# Patient Record
Sex: MALE | Race: WHITE | NOT HISPANIC OR LATINO | Employment: FULL TIME | URBAN - METROPOLITAN AREA
[De-identification: names, ages, dates, MRNs, and addresses within clinical notes are randomized per-mention and may not be internally consistent; named-entity substitution may affect disease eponyms.]

---

## 2020-10-08 ENCOUNTER — OFFICE VISIT (OUTPATIENT)
Dept: URGENT CARE | Facility: CLINIC | Age: 35
End: 2020-10-08
Payer: COMMERCIAL

## 2020-10-08 VITALS
HEART RATE: 103 BPM | BODY MASS INDEX: 32.93 KG/M2 | TEMPERATURE: 98 F | WEIGHT: 230 LBS | HEIGHT: 70 IN | SYSTOLIC BLOOD PRESSURE: 129 MMHG | OXYGEN SATURATION: 98 % | DIASTOLIC BLOOD PRESSURE: 69 MMHG

## 2020-10-08 DIAGNOSIS — T78.40XA ALLERGIC REACTION, INITIAL ENCOUNTER: Primary | ICD-10-CM

## 2020-10-08 PROCEDURE — 96372 PR INJECTION,THERAP/PROPH/DIAG2ST, IM OR SUBCUT: ICD-10-PCS | Mod: S$GLB,,, | Performed by: INTERNAL MEDICINE

## 2020-10-08 PROCEDURE — 99214 PR OFFICE/OUTPT VISIT, EST, LEVL IV, 30-39 MIN: ICD-10-PCS | Mod: 25,S$GLB,, | Performed by: INTERNAL MEDICINE

## 2020-10-08 PROCEDURE — 96372 THER/PROPH/DIAG INJ SC/IM: CPT | Mod: S$GLB,,, | Performed by: INTERNAL MEDICINE

## 2020-10-08 PROCEDURE — 99214 OFFICE O/P EST MOD 30 MIN: CPT | Mod: 25,S$GLB,, | Performed by: INTERNAL MEDICINE

## 2020-10-08 RX ORDER — BETAMETHASONE SODIUM PHOSPHATE AND BETAMETHASONE ACETATE 3; 3 MG/ML; MG/ML
9 INJECTION, SUSPENSION INTRA-ARTICULAR; INTRALESIONAL; INTRAMUSCULAR; SOFT TISSUE ONCE
Status: COMPLETED | OUTPATIENT
Start: 2020-10-08 | End: 2020-10-08

## 2020-10-08 RX ADMIN — BETAMETHASONE SODIUM PHOSPHATE AND BETAMETHASONE ACETATE 9 MG: 3; 3 INJECTION, SUSPENSION INTRA-ARTICULAR; INTRALESIONAL; INTRAMUSCULAR; SOFT TISSUE at 09:10

## 2020-10-08 NOTE — PROGRESS NOTES
"Subjective:       Patient ID: Guillermo Wellington is a 35 y.o. male.    Vitals:  height is 5' 10" (1.778 m) and weight is 104.3 kg (230 lb). His temperature is 98.4 °F (36.9 °C). His blood pressure is 129/69 and his pulse is 103. His oxygen saturation is 98%.     Chief Complaint: Eye Problem and Allergic Reaction    Pt states that he had a reaction to an unknown substance yesterday at approximately 4:00pm. Pt states that he took a child's dose of benadryl with no relief. Swelling has improved since yesterday. NO CP, SOB, hives    Eye Problem   Both eyes are affected.This is a new problem. The current episode started yesterday. The problem occurs constantly. The problem has been gradually improving. There was no injury mechanism. The pain is at a severity of 0/10. The patient is experiencing no pain. There is no known exposure to pink eye. He does not wear contacts. Associated symptoms include itching. Pertinent negatives include no blurred vision, eye discharge, double vision, eye redness, fever, foreign body sensation, nausea, photophobia, recent URI or vomiting. He has tried nothing (bendryl) for the symptoms. The treatment provided no relief.   Allergic Reaction  This is a new problem. The current episode started yesterday. The problem has been gradually improving since onset. The problem is mild. It is unknown what he was exposed to. The exposure occurred at home. Associated symptoms include eye itching. Pertinent negatives include no abdominal pain, chest pain, chest pressure, coughing, diarrhea, difficulty breathing, drooling, eye redness, eye watering, globus sensation, hyperventilation, itching, rash, skin blistering, stridor, trouble swallowing, vomiting or wheezing. Past treatments include diphenhydramine. The treatment provided no relief. There is no history of asthma, atopic dermatitis, food allergies, medication allergies or seasonal allergies. Swelling is present on the eyes.       Constitution: Negative for " chills, fatigue and fever.   HENT: Negative for drooling, congestion, sore throat and trouble swallowing.    Neck: Negative for painful lymph nodes.   Cardiovascular: Negative for chest pain and leg swelling.   Eyes: Positive for eye itching and eyelid swelling. Negative for eye discharge, eye redness, photophobia, double vision and blurred vision.   Respiratory: Negative for cough, shortness of breath, stridor and wheezing.    Gastrointestinal: Negative for abdominal pain, nausea, vomiting and diarrhea.   Genitourinary: Negative for dysuria, frequency and urgency.   Musculoskeletal: Negative for joint pain, joint swelling, muscle cramps and muscle ache.   Skin: Negative for color change, pale and rash.   Allergic/Immunologic: Negative for seasonal allergies, food allergies and itching.   Neurological: Negative for dizziness, history of vertigo, light-headedness, passing out and headaches.   Hematologic/Lymphatic: Negative for swollen lymph nodes, easy bruising/bleeding and history of blood clots. Does not bruise/bleed easily.   Psychiatric/Behavioral: Negative for nervous/anxious, sleep disturbance and depression. The patient is not nervous/anxious.        Objective:      Physical Exam   Constitutional: normal  HENT:   Head: Normocephalic and atraumatic.   Nose: Nose normal.   Mouth/Throat: Mucous membranes are moist. Oropharynx is clear.   Eyes: Pupils are equal, round, and reactive to light. Conjunctivae are normal.      Comments: Periorbital swelling,erythema and pruritis extraocular movement intact  Neck: Normal range of motion. Neck supple.   Cardiovascular: Normal rate, regular rhythm, normal heart sounds and normal pulses.   Pulmonary/Chest: Effort normal and breath sounds normal.   Abdominal: Normal appearance.   Neurological: He is alert.   Skin:         Comments: Generalized mild swelling, erythema and pruritis with much worse in periorbital areas   Nursing note and vitals reviewed.        Assessment:        1. Allergic reaction, initial encounter        Plan:         Allergic reaction, initial encounter  -     betamethasone acetate-betamethasone sodium phosphate injection 9 mg

## 2021-04-13 ENCOUNTER — PATIENT MESSAGE (OUTPATIENT)
Dept: RESEARCH | Facility: HOSPITAL | Age: 36
End: 2021-04-13

## 2021-04-21 ENCOUNTER — OFFICE VISIT (OUTPATIENT)
Dept: URGENT CARE | Facility: CLINIC | Age: 36
End: 2021-04-21
Payer: COMMERCIAL

## 2021-04-21 VITALS
SYSTOLIC BLOOD PRESSURE: 122 MMHG | DIASTOLIC BLOOD PRESSURE: 82 MMHG | TEMPERATURE: 99 F | WEIGHT: 230 LBS | HEIGHT: 70 IN | OXYGEN SATURATION: 99 % | RESPIRATION RATE: 16 BRPM | HEART RATE: 86 BPM | BODY MASS INDEX: 32.93 KG/M2

## 2021-04-21 DIAGNOSIS — J06.9 VIRAL URI WITH COUGH: ICD-10-CM

## 2021-04-21 DIAGNOSIS — R05.8 POST-VIRAL COUGH SYNDROME: Primary | ICD-10-CM

## 2021-04-21 LAB
CTP QC/QA: YES
SARS-COV-2 RDRP RESP QL NAA+PROBE: NEGATIVE

## 2021-04-21 PROCEDURE — 99214 OFFICE O/P EST MOD 30 MIN: CPT | Mod: S$GLB,,, | Performed by: PHYSICIAN ASSISTANT

## 2021-04-21 PROCEDURE — U0002: ICD-10-PCS | Mod: QW,S$GLB,, | Performed by: PHYSICIAN ASSISTANT

## 2021-04-21 PROCEDURE — 71046 XR CHEST PA AND LATERAL: ICD-10-PCS | Mod: S$GLB,,, | Performed by: RADIOLOGY

## 2021-04-21 PROCEDURE — 3008F PR BODY MASS INDEX (BMI) DOCUMENTED: ICD-10-PCS | Mod: CPTII,S$GLB,, | Performed by: PHYSICIAN ASSISTANT

## 2021-04-21 PROCEDURE — 3008F BODY MASS INDEX DOCD: CPT | Mod: CPTII,S$GLB,, | Performed by: PHYSICIAN ASSISTANT

## 2021-04-21 PROCEDURE — 99214 PR OFFICE/OUTPT VISIT, EST, LEVL IV, 30-39 MIN: ICD-10-PCS | Mod: S$GLB,,, | Performed by: PHYSICIAN ASSISTANT

## 2021-04-21 PROCEDURE — U0002 COVID-19 LAB TEST NON-CDC: HCPCS | Mod: QW,S$GLB,, | Performed by: PHYSICIAN ASSISTANT

## 2021-04-21 PROCEDURE — 71046 X-RAY EXAM CHEST 2 VIEWS: CPT | Mod: S$GLB,,, | Performed by: RADIOLOGY

## 2021-04-21 RX ORDER — PREDNISONE 20 MG/1
TABLET ORAL
Qty: 8 TABLET | Refills: 0 | Status: SHIPPED | OUTPATIENT
Start: 2021-04-21 | End: 2021-04-26

## 2021-04-21 RX ORDER — PROMETHAZINE HYDROCHLORIDE AND DEXTROMETHORPHAN HYDROBROMIDE 6.25; 15 MG/5ML; MG/5ML
5 SYRUP ORAL NIGHTLY PRN
Qty: 118 ML | Refills: 0 | Status: SHIPPED | OUTPATIENT
Start: 2021-04-21 | End: 2021-05-01

## 2021-04-21 RX ORDER — BENZONATATE 200 MG/1
200 CAPSULE ORAL 3 TIMES DAILY PRN
Qty: 30 CAPSULE | Refills: 0 | Status: SHIPPED | OUTPATIENT
Start: 2021-04-21 | End: 2021-05-01

## 2022-01-07 ENCOUNTER — LAB VISIT (OUTPATIENT)
Dept: PRIMARY CARE CLINIC | Facility: CLINIC | Age: 37
End: 2022-01-07
Payer: COMMERCIAL

## 2022-01-07 DIAGNOSIS — Z20.822 CONTACT WITH AND (SUSPECTED) EXPOSURE TO COVID-19: ICD-10-CM

## 2022-01-07 LAB
CTP QC/QA: YES
SARS-COV-2 AG RESP QL IA.RAPID: NEGATIVE

## 2022-01-07 PROCEDURE — 87811 SARS-COV-2 COVID19 W/OPTIC: CPT

## 2022-11-13 ENCOUNTER — OFFICE VISIT (OUTPATIENT)
Dept: URGENT CARE | Facility: CLINIC | Age: 37
End: 2022-11-13
Payer: COMMERCIAL

## 2022-11-13 VITALS
WEIGHT: 200 LBS | SYSTOLIC BLOOD PRESSURE: 127 MMHG | HEIGHT: 70 IN | HEART RATE: 98 BPM | DIASTOLIC BLOOD PRESSURE: 81 MMHG | BODY MASS INDEX: 28.63 KG/M2 | OXYGEN SATURATION: 96 % | TEMPERATURE: 100 F | RESPIRATION RATE: 20 BRPM

## 2022-11-13 DIAGNOSIS — J06.9 VIRAL URI WITH COUGH: Primary | ICD-10-CM

## 2022-11-13 DIAGNOSIS — J02.9 SORE THROAT: ICD-10-CM

## 2022-11-13 LAB
CTP QC/QA: YES
MOLECULAR STREP A: NEGATIVE

## 2022-11-13 PROCEDURE — 87651 STREP A DNA AMP PROBE: CPT | Mod: QW,S$GLB,, | Performed by: NURSE PRACTITIONER

## 2022-11-13 PROCEDURE — 1159F PR MEDICATION LIST DOCUMENTED IN MEDICAL RECORD: ICD-10-PCS | Mod: CPTII,S$GLB,, | Performed by: NURSE PRACTITIONER

## 2022-11-13 PROCEDURE — 1160F PR REVIEW ALL MEDS BY PRESCRIBER/CLIN PHARMACIST DOCUMENTED: ICD-10-PCS | Mod: CPTII,S$GLB,, | Performed by: NURSE PRACTITIONER

## 2022-11-13 PROCEDURE — 3008F PR BODY MASS INDEX (BMI) DOCUMENTED: ICD-10-PCS | Mod: CPTII,S$GLB,, | Performed by: NURSE PRACTITIONER

## 2022-11-13 PROCEDURE — 3008F BODY MASS INDEX DOCD: CPT | Mod: CPTII,S$GLB,, | Performed by: NURSE PRACTITIONER

## 2022-11-13 PROCEDURE — 99213 PR OFFICE/OUTPT VISIT, EST, LEVL III, 20-29 MIN: ICD-10-PCS | Mod: S$GLB,,, | Performed by: NURSE PRACTITIONER

## 2022-11-13 PROCEDURE — 3074F PR MOST RECENT SYSTOLIC BLOOD PRESSURE < 130 MM HG: ICD-10-PCS | Mod: CPTII,S$GLB,, | Performed by: NURSE PRACTITIONER

## 2022-11-13 PROCEDURE — 3079F PR MOST RECENT DIASTOLIC BLOOD PRESSURE 80-89 MM HG: ICD-10-PCS | Mod: CPTII,S$GLB,, | Performed by: NURSE PRACTITIONER

## 2022-11-13 PROCEDURE — 1160F RVW MEDS BY RX/DR IN RCRD: CPT | Mod: CPTII,S$GLB,, | Performed by: NURSE PRACTITIONER

## 2022-11-13 PROCEDURE — 3074F SYST BP LT 130 MM HG: CPT | Mod: CPTII,S$GLB,, | Performed by: NURSE PRACTITIONER

## 2022-11-13 PROCEDURE — 87651 POCT STREP A MOLECULAR: ICD-10-PCS | Mod: QW,S$GLB,, | Performed by: NURSE PRACTITIONER

## 2022-11-13 PROCEDURE — 99213 OFFICE O/P EST LOW 20 MIN: CPT | Mod: S$GLB,,, | Performed by: NURSE PRACTITIONER

## 2022-11-13 PROCEDURE — 1159F MED LIST DOCD IN RCRD: CPT | Mod: CPTII,S$GLB,, | Performed by: NURSE PRACTITIONER

## 2022-11-13 PROCEDURE — 3079F DIAST BP 80-89 MM HG: CPT | Mod: CPTII,S$GLB,, | Performed by: NURSE PRACTITIONER

## 2022-11-13 RX ORDER — IPRATROPIUM BROMIDE 21 UG/1
2 SPRAY, METERED NASAL 2 TIMES DAILY
Qty: 30 ML | Refills: 0 | Status: SHIPPED | OUTPATIENT
Start: 2022-11-13 | End: 2022-11-20

## 2022-11-13 RX ORDER — PROMETHAZINE HYDROCHLORIDE AND DEXTROMETHORPHAN HYDROBROMIDE 6.25; 15 MG/5ML; MG/5ML
5 SYRUP ORAL EVERY 8 HOURS PRN
Qty: 118 ML | Refills: 0 | Status: SHIPPED | OUTPATIENT
Start: 2022-11-13 | End: 2022-11-23

## 2022-11-13 NOTE — PROGRESS NOTES
"Subjective:       Patient ID: Guillermo Wellington is a 37 y.o. male.    Vitals:  height is 5' 10" (1.778 m) and weight is 90.7 kg (200 lb). His temperature is 99.5 °F (37.5 °C). His blood pressure is 127/81 and his pulse is 98. His respiration is 20 and oxygen saturation is 96%.     Chief Complaint: Sore Throat and Eye Problem    This is a 37 y.o. male who presents today with a chief complaint of sore throat x 7 days and eye redness and discharge x today. Pt has been taking day quill.     Daughter recently with strep throat.    Sore Throat   This is a new problem. The current episode started in the past 7 days. The problem has been gradually worsening. The pain is worse on the right side. There has been no fever. The pain is at a severity of 5/10. The pain is moderate. Associated symptoms include headaches. Pertinent negatives include no coughing, diarrhea, drooling or neck pain. He has had no exposure to strep or mono. Treatments tried: day quill. The treatment provided mild relief.   Eye Problem   The left eye is affected. This is a new problem. The current episode started today. The problem has been unchanged. There was no injury mechanism. The pain is at a severity of 3/10. The patient is experiencing no pain. There is No known exposure to pink eye. He Does not wear contacts. Associated symptoms include an eye discharge and eye redness. Pertinent negatives include no blurred vision, double vision or foreign body sensation. He has tried nothing for the symptoms.     HENT:  Positive for sore throat. Negative for drooling.    Neck: Negative for neck pain.   Eyes:  Positive for eye discharge and eye redness. Negative for double vision and blurred vision.   Respiratory:  Negative for cough.    Gastrointestinal:  Negative for diarrhea.   Neurological:  Positive for headaches.     Objective:      Physical Exam   Constitutional: He is oriented to person, place, and time. He appears well-developed. He is cooperative.  Non-toxic " appearance. He does not appear ill. No distress.   HENT:   Head: Normocephalic and atraumatic.   Ears:   Right Ear: Hearing, tympanic membrane, external ear and ear canal normal.   Left Ear: Hearing, tympanic membrane, external ear and ear canal normal.   Nose: Rhinorrhea present. No mucosal edema or nasal deformity. No epistaxis. Right sinus exhibits no maxillary sinus tenderness and no frontal sinus tenderness. Left sinus exhibits no maxillary sinus tenderness and no frontal sinus tenderness.   Mouth/Throat: Uvula is midline, oropharynx is clear and moist and mucous membranes are normal. No trismus in the jaw. Normal dentition. No uvula swelling. Cobblestoning present. No oropharyngeal exudate, posterior oropharyngeal edema or posterior oropharyngeal erythema.   Eyes: Lids are normal. Left eye exhibits exudate. Left conjunctiva is injected. No scleral icterus.      Comments: Small amount of white purulent material had inner canthus of left eye   Neck: Trachea normal and phonation normal. Neck supple. No edema present. No erythema present. No neck rigidity present.   Cardiovascular: Normal rate, regular rhythm, normal heart sounds and normal pulses.   Pulmonary/Chest: Effort normal and breath sounds normal. No stridor. No respiratory distress. He has no decreased breath sounds. He has no wheezes. He has no rhonchi. He has no rales.   Abdominal: Normal appearance.   Musculoskeletal: Normal range of motion.         General: No deformity. Normal range of motion.   Neurological: He is alert and oriented to person, place, and time. He exhibits normal muscle tone. Coordination normal.   Skin: Skin is warm, dry, intact, not diaphoretic and not pale.   Psychiatric: His speech is normal and behavior is normal. Judgment and thought content normal.   Nursing note and vitals reviewed.      Results for orders placed or performed in visit on 11/13/22   POCT Strep A, Molecular   Result Value Ref Range    Molecular Strep A, POC  Negative Negative     Acceptable Yes        Assessment:       1. Viral URI with cough    2. Sore throat          Plan:       Labs ordered at this visit reviewed.     Viral URI with cough  -     promethazine-dextromethorphan (PROMETHAZINE-DM) 6.25-15 mg/5 mL Syrp; Take 5 mLs by mouth every 8 (eight) hours as needed (Cough).  Dispense: 118 mL; Refill: 0  -     ipratropium (ATROVENT) 21 mcg (0.03 %) nasal spray; 2 sprays by Each Nostril route 2 (two) times daily. for 7 days  Dispense: 30 mL; Refill: 0    Sore throat  -     POCT Strep A, Molecular  -     (Magic mouthwash) 1:1:1 diphenhydrAMINE(Benadryl) 12.5mg/5ml liq, aluminum & magnesium hydroxide-simethicone (Maalox), LIDOcaine viscous 2%; Swish and spit 5 mLs every 4 (four) hours as needed (Gargle and spit for sore throat pain). for mouth sores  Dispense: 360 mL; Refill: 0

## 2024-04-06 ENCOUNTER — OFFICE VISIT (OUTPATIENT)
Dept: URGENT CARE | Facility: CLINIC | Age: 39
End: 2024-04-06
Payer: COMMERCIAL

## 2024-04-06 VITALS
HEART RATE: 108 BPM | DIASTOLIC BLOOD PRESSURE: 93 MMHG | BODY MASS INDEX: 28.63 KG/M2 | SYSTOLIC BLOOD PRESSURE: 148 MMHG | OXYGEN SATURATION: 97 % | RESPIRATION RATE: 18 BRPM | HEIGHT: 70 IN | WEIGHT: 200 LBS | TEMPERATURE: 99 F

## 2024-04-06 DIAGNOSIS — B08.4 HAND, FOOT AND MOUTH DISEASE (HFMD): Primary | ICD-10-CM

## 2024-04-06 PROCEDURE — 99213 OFFICE O/P EST LOW 20 MIN: CPT | Mod: S$GLB,,,

## 2024-04-06 NOTE — PATIENT INSTRUCTIONS
"What is hand, foot, and mouth disease?  Hand, foot, and mouth disease is an infection that causes sores in the mouth and on the hands, feet, and buttocks. It most often affects young children, but older children and adults can get it, too.  A related infection, called "herpangina," causes sores just in the mouth and throat.  This article is mostly about hand, foot, and mouth disease. But herpangina has similar symptoms and is treated in the same way.  Hand, foot, and mouth disease usually goes away on its own within a week or so. But there are things that you can do to help relieve symptoms.  What are the symptoms of hand, foot, and mouth disease?  The main symptom is sores in the mouth and on the hands, feet, and buttocks. They can look like small spots, bumps, or blisters. The sores in the mouth can make swallowing painful. The sores on the hands and feet might be painful. It is possible to get the sores only in some areas. Not every person gets them on their hands, feet, and mouth.  Herpangina can also cause sores inside the throat.  Hand, foot, and mouth disease sometimes causes a fever. People with herpangina usually get a high fever that starts suddenly.  How does hand, foot, and mouth disease spread?  The virus that causes hand, foot, and mouth disease can live in body fluids of an infected person. For example, the virus can be found in:  ?Mucus from the nose  ?Saliva  ?Fluid from the sores  ?Traces of bowel movements  People with hand, foot, and mouth disease are most likely to spread the infection during the first week of their illness. But the virus can live in their body for weeks or even months after the symptoms have gone away.  Is there a test for hand, foot, and mouth disease?  Yes, but it is not usually needed. A doctor or nurse should be able to tell if a child has it by learning about their symptoms and doing an exam.  How can I care for my child at home?  Hand, foot, and mouth disease usually goes " away on its own within about a week. It does not need specific treatment.  To help your child feel better, you can:  ?Give non-prescription medicines such as acetaminophen (sample brand name: Tylenol) or ibuprofen (sample brand names: Advil, Motrin) to relieve pain. Never give aspirin to a child younger than 18 years. In children, aspirin can cause a serious problem called Reye syndrome.  ?Offer the child plenty of fluids. The sores in the mouth can make swallowing painful, so some children might not want to eat or drink. Make sure that children get enough fluids so that they don't get dehydrated. Cold foods, like popsicles and ice cream, can help to numb the pain. Soft foods, like pudding and gelatin, might be easier to swallow.  ?If your child is older than 6 years, have them rinse their mouth with salt water. This might help with pain. To do this, mix 1/4 to 1/2 teaspoon of salt into 8 ounces of warm water. Your child can swish the water in their mouth, then spit it out. They should not swallow it. They can do this 2 or 3 times a day.  Treatment for herpangina is the same as for hand, foot, and mouth disease.  Can hand, foot, and mouth disease be prevented?  Yes. The best thing that you can do to prevent the spread of this infection is to wash your hands often with soap and water, even after the child is feeling better. Teach children to wash their hands often, especially after using the bathroom (figure 1). Younger children might need help with this.  It's also important to disinfect tabletops, toys, and other things that a child might touch.  If a child has hand, foot, and mouth disease or herpangina, keep them out of school or day care if they have a fever or if they don't feel well enough to go. You should also keep the child home if they are drooling a lot or have open sores. Do not let them pick at their sores.  When should I call the doctor?  Call for advice if your child:  ?Has signs that their sores are  infected - These include:  Swelling, redness, or warmth around the sore  Pain when touching the area  Yellow, green, or bloody discharge  Bad smell from the sore  ?Has a lot of trouble eating or drinking  ?Is not urinating often enough:  For babies and young children, call if they have not had a wet diaper in 4 to 6 hours.  For older children, call if they have not urinated for 6 to 8 hours (while awake).  ?Does not start feeling better after 2 to 3 days, or is feeling worse      - You must understand that you have received an Urgent Care treatment only and that you may be released before all of your medical problems are known or treated.   - You, the patient, will arrange for follow up care as instructed.   - If your condition worsens or fails to improve we recommend that you receive another evaluation at the ER immediately or contact your PCP to discuss your concerns or return here.   - Follow up with your PCP or specialty clinic as directed in the next 1-2 weeks if not improved or as needed.  You can call [phone] to schedule an appointment with the appropriate provider.    If your symptoms do not improve or worsen, go to the emergency room immediately.

## 2024-04-06 NOTE — PROGRESS NOTES
"Subjective:      Patient ID: Guillermo Wellington is a 38 y.o. male.    Vitals:  height is 5' 10" (1.778 m) and weight is 90.7 kg (200 lb). His oral temperature is 98.5 °F (36.9 °C). His blood pressure is 148/93 (abnormal) and his pulse is 108. His respiration is 18 and oxygen saturation is 97%.     Chief Complaint: Rash and Fever    This is a 38 y.o. male who presents today with a chief complaint of fever that started on Thursday. Pt states he notice the rash on yesterday. Rash is only located on the palms of the hands and the feet. He is also having a sore throat. Denies possibility of Stds. Not concerned for syphilis at this time. He is monogamous with his wife. States one of his kids was recently sick with URI symptoms, but never exhibited a hand and foot rash.      Fever   This is a new problem. The current episode started in the past 7 days. The maximum temperature noted was 102 to 102.9 F. The temperature was taken using an axillary reading. Associated symptoms include a rash and a sore throat. Treatments tried: dayquil. The treatment provided mild relief.   Rash  This is a new problem. The current episode started in the past 7 days. The problem is unchanged. The affected locations include the right foot, left foot, left hand and right hand. It is unknown if there was an exposure to a precipitant. Associated symptoms include fatigue, a fever, joint pain and a sore throat. Past treatments include nothing.     Constitution: Positive for fatigue and fever.   HENT:  Positive for sore throat.    Skin:  Positive for rash and erythema.      Objective:     Physical Exam   Constitutional: He is oriented to person, place, and time. He appears well-developed.      Comments:Patient sits comfortably in exam chair. Answers questions in complete sentences. Does not show any signs of distress or discoloration.        HENT:   Head: Normocephalic and atraumatic. Head is without abrasion, without contusion and without laceration.   Ears: " "  Right Ear: External ear normal. no impacted cerumen  Left Ear: External ear normal. no impacted cerumen  Nose: Nose normal. No rhinorrhea or congestion.   Mouth/Throat: Mucous membranes are normal. Posterior oropharyngeal erythema present. No oropharyngeal exudate. No tonsillar exudate.   Eyes: Conjunctivae, EOM and lids are normal. Pupils are equal, round, and reactive to light.   Neck: Phonation normal. Neck supple.   Cardiovascular: Normal rate, regular rhythm and normal heart sounds.   Pulmonary/Chest: Effort normal. No respiratory distress.   Musculoskeletal: Normal range of motion.         General: Normal range of motion.   Neurological: He is alert and oriented to person, place, and time.   Skin: Skin is warm, dry, intact and rash. Capillary refill takes less than 2 seconds. erythema No abrasion, No burn, No bruising and No ecchymosis        Psychiatric: His speech is normal and behavior is normal. Judgment and thought content normal.   Nursing note and vitals reviewed.      Assessment:     1. Hand, foot and mouth disease (HFMD)        Plan:       Hand, foot and mouth disease (HFMD)                  Patient Instructions   What is hand, foot, and mouth disease?  Hand, foot, and mouth disease is an infection that causes sores in the mouth and on the hands, feet, and buttocks. It most often affects young children, but older children and adults can get it, too.  A related infection, called "herpangina," causes sores just in the mouth and throat.  This article is mostly about hand, foot, and mouth disease. But herpangina has similar symptoms and is treated in the same way.  Hand, foot, and mouth disease usually goes away on its own within a week or so. But there are things that you can do to help relieve symptoms.  What are the symptoms of hand, foot, and mouth disease?  The main symptom is sores in the mouth and on the hands, feet, and buttocks. They can look like small spots, bumps, or blisters. The sores in " the mouth can make swallowing painful. The sores on the hands and feet might be painful. It is possible to get the sores only in some areas. Not every person gets them on their hands, feet, and mouth.  Herpangina can also cause sores inside the throat.  Hand, foot, and mouth disease sometimes causes a fever. People with herpangina usually get a high fever that starts suddenly.  How does hand, foot, and mouth disease spread?  The virus that causes hand, foot, and mouth disease can live in body fluids of an infected person. For example, the virus can be found in:  ?Mucus from the nose  ?Saliva  ?Fluid from the sores  ?Traces of bowel movements  People with hand, foot, and mouth disease are most likely to spread the infection during the first week of their illness. But the virus can live in their body for weeks or even months after the symptoms have gone away.  Is there a test for hand, foot, and mouth disease?  Yes, but it is not usually needed. A doctor or nurse should be able to tell if a child has it by learning about their symptoms and doing an exam.  How can I care for my child at home?  Hand, foot, and mouth disease usually goes away on its own within about a week. It does not need specific treatment.  To help your child feel better, you can:  ?Give non-prescription medicines such as acetaminophen (sample brand name: Tylenol) or ibuprofen (sample brand names: Advil, Motrin) to relieve pain. Never give aspirin to a child younger than 18 years. In children, aspirin can cause a serious problem called Reye syndrome.  ?Offer the child plenty of fluids. The sores in the mouth can make swallowing painful, so some children might not want to eat or drink. Make sure that children get enough fluids so that they don't get dehydrated. Cold foods, like popsicles and ice cream, can help to numb the pain. Soft foods, like pudding and gelatin, might be easier to swallow.  ?If your child is older than 6 years, have them rinse  their mouth with salt water. This might help with pain. To do this, mix 1/4 to 1/2 teaspoon of salt into 8 ounces of warm water. Your child can swish the water in their mouth, then spit it out. They should not swallow it. They can do this 2 or 3 times a day.  Treatment for herpangina is the same as for hand, foot, and mouth disease.  Can hand, foot, and mouth disease be prevented?  Yes. The best thing that you can do to prevent the spread of this infection is to wash your hands often with soap and water, even after the child is feeling better. Teach children to wash their hands often, especially after using the bathroom (figure 1). Younger children might need help with this.  It's also important to disinfect tabletops, toys, and other things that a child might touch.  If a child has hand, foot, and mouth disease or herpangina, keep them out of school or day care if they have a fever or if they don't feel well enough to go. You should also keep the child home if they are drooling a lot or have open sores. Do not let them pick at their sores.  When should I call the doctor?  Call for advice if your child:  ?Has signs that their sores are infected - These include:  Swelling, redness, or warmth around the sore  Pain when touching the area  Yellow, green, or bloody discharge  Bad smell from the sore  ?Has a lot of trouble eating or drinking  ?Is not urinating often enough:  For babies and young children, call if they have not had a wet diaper in 4 to 6 hours.  For older children, call if they have not urinated for 6 to 8 hours (while awake).  ?Does not start feeling better after 2 to 3 days, or is feeling worse      - You must understand that you have received an Urgent Care treatment only and that you may be released before all of your medical problems are known or treated.   - You, the patient, will arrange for follow up care as instructed.   - If your condition worsens or fails to improve we recommend that you receive  another evaluation at the ER immediately or contact your PCP to discuss your concerns or return here.   - Follow up with your PCP or specialty clinic as directed in the next 1-2 weeks if not improved or as needed.  You can call [phone] to schedule an appointment with the appropriate provider.    If your symptoms do not improve or worsen, go to the emergency room immediately.

## 2024-08-05 ENCOUNTER — LAB VISIT (OUTPATIENT)
Dept: LAB | Facility: HOSPITAL | Age: 39
End: 2024-08-05
Attending: FAMILY MEDICINE
Payer: COMMERCIAL

## 2024-08-05 ENCOUNTER — OFFICE VISIT (OUTPATIENT)
Dept: INTERNAL MEDICINE | Facility: CLINIC | Age: 39
End: 2024-08-05
Payer: COMMERCIAL

## 2024-08-05 VITALS
SYSTOLIC BLOOD PRESSURE: 134 MMHG | DIASTOLIC BLOOD PRESSURE: 88 MMHG | BODY MASS INDEX: 34.32 KG/M2 | WEIGHT: 239.19 LBS | HEART RATE: 85 BPM | OXYGEN SATURATION: 98 %

## 2024-08-05 DIAGNOSIS — Z00.00 ANNUAL PHYSICAL EXAM: Primary | ICD-10-CM

## 2024-08-05 DIAGNOSIS — H91.90 SUBJECTIVE HEARING CHANGE: ICD-10-CM

## 2024-08-05 DIAGNOSIS — Z30.09 VASECTOMY EVALUATION: ICD-10-CM

## 2024-08-05 DIAGNOSIS — Z76.89 ENCOUNTER TO ESTABLISH CARE WITH NEW DOCTOR: ICD-10-CM

## 2024-08-05 DIAGNOSIS — Z00.00 ANNUAL PHYSICAL EXAM: ICD-10-CM

## 2024-08-05 DIAGNOSIS — L72.9 CYST OF SKIN: ICD-10-CM

## 2024-08-05 DIAGNOSIS — E66.09 CLASS 1 OBESITY DUE TO EXCESS CALORIES WITHOUT SERIOUS COMORBIDITY WITH BODY MASS INDEX (BMI) OF 34.0 TO 34.9 IN ADULT: ICD-10-CM

## 2024-08-05 PROBLEM — E66.811 CLASS 1 OBESITY DUE TO EXCESS CALORIES WITHOUT SERIOUS COMORBIDITY WITH BODY MASS INDEX (BMI) OF 34.0 TO 34.9 IN ADULT: Status: ACTIVE | Noted: 2024-08-05

## 2024-08-05 LAB
ALBUMIN SERPL BCP-MCNC: 4.3 G/DL (ref 3.5–5.2)
ALP SERPL-CCNC: 86 U/L (ref 55–135)
ALT SERPL W/O P-5'-P-CCNC: 27 U/L (ref 10–44)
ANION GAP SERPL CALC-SCNC: 12 MMOL/L (ref 8–16)
AST SERPL-CCNC: 25 U/L (ref 10–40)
BILIRUB SERPL-MCNC: 0.6 MG/DL (ref 0.1–1)
BUN SERPL-MCNC: 18 MG/DL (ref 6–20)
CALCIUM SERPL-MCNC: 9.7 MG/DL (ref 8.7–10.5)
CHLORIDE SERPL-SCNC: 105 MMOL/L (ref 95–110)
CHOLEST SERPL-MCNC: 204 MG/DL (ref 120–199)
CHOLEST/HDLC SERPL: 5.5 {RATIO} (ref 2–5)
CO2 SERPL-SCNC: 21 MMOL/L (ref 23–29)
CREAT SERPL-MCNC: 1.1 MG/DL (ref 0.5–1.4)
ERYTHROCYTE [DISTWIDTH] IN BLOOD BY AUTOMATED COUNT: 12.2 % (ref 11.5–14.5)
EST. GFR  (NO RACE VARIABLE): >60 ML/MIN/1.73 M^2
ESTIMATED AVG GLUCOSE: 94 MG/DL (ref 68–131)
GLUCOSE SERPL-MCNC: 97 MG/DL (ref 70–110)
HBA1C MFR BLD: 4.9 % (ref 4–5.6)
HCT VFR BLD AUTO: 46.3 % (ref 40–54)
HCV AB SERPL QL IA: NORMAL
HDLC SERPL-MCNC: 37 MG/DL (ref 40–75)
HDLC SERPL: 18.1 % (ref 20–50)
HGB BLD-MCNC: 15.8 G/DL (ref 14–18)
LDLC SERPL CALC-MCNC: 132.6 MG/DL (ref 63–159)
MCH RBC QN AUTO: 31 PG (ref 27–31)
MCHC RBC AUTO-ENTMCNC: 34.1 G/DL (ref 32–36)
MCV RBC AUTO: 91 FL (ref 82–98)
NONHDLC SERPL-MCNC: 167 MG/DL
PLATELET # BLD AUTO: 299 K/UL (ref 150–450)
PMV BLD AUTO: 10.1 FL (ref 9.2–12.9)
POTASSIUM SERPL-SCNC: 4.3 MMOL/L (ref 3.5–5.1)
PROT SERPL-MCNC: 7.8 G/DL (ref 6–8.4)
RBC # BLD AUTO: 5.1 M/UL (ref 4.6–6.2)
SODIUM SERPL-SCNC: 138 MMOL/L (ref 136–145)
TRIGL SERPL-MCNC: 172 MG/DL (ref 30–150)
TSH SERPL DL<=0.005 MIU/L-ACNC: 2.02 UIU/ML (ref 0.4–4)
WBC # BLD AUTO: 7.5 K/UL (ref 3.9–12.7)

## 2024-08-05 PROCEDURE — 99385 PREV VISIT NEW AGE 18-39: CPT | Mod: S$GLB,,, | Performed by: FAMILY MEDICINE

## 2024-08-05 PROCEDURE — 80061 LIPID PANEL: CPT | Performed by: FAMILY MEDICINE

## 2024-08-05 PROCEDURE — 99999 PR PBB SHADOW E&M-EST. PATIENT-LVL IV: CPT | Mod: PBBFAC,,, | Performed by: FAMILY MEDICINE

## 2024-08-05 PROCEDURE — 86803 HEPATITIS C AB TEST: CPT | Performed by: FAMILY MEDICINE

## 2024-08-05 PROCEDURE — 83036 HEMOGLOBIN GLYCOSYLATED A1C: CPT | Performed by: FAMILY MEDICINE

## 2024-08-05 PROCEDURE — 36415 COLL VENOUS BLD VENIPUNCTURE: CPT | Performed by: FAMILY MEDICINE

## 2024-08-05 PROCEDURE — 3008F BODY MASS INDEX DOCD: CPT | Mod: CPTII,S$GLB,, | Performed by: FAMILY MEDICINE

## 2024-08-05 PROCEDURE — 84443 ASSAY THYROID STIM HORMONE: CPT | Performed by: FAMILY MEDICINE

## 2024-08-05 PROCEDURE — 80053 COMPREHEN METABOLIC PANEL: CPT | Performed by: FAMILY MEDICINE

## 2024-08-05 PROCEDURE — 85027 COMPLETE CBC AUTOMATED: CPT | Performed by: FAMILY MEDICINE

## 2024-08-05 PROCEDURE — 3079F DIAST BP 80-89 MM HG: CPT | Mod: CPTII,S$GLB,, | Performed by: FAMILY MEDICINE

## 2024-08-05 PROCEDURE — 1159F MED LIST DOCD IN RCRD: CPT | Mod: CPTII,S$GLB,, | Performed by: FAMILY MEDICINE

## 2024-08-05 PROCEDURE — 3075F SYST BP GE 130 - 139MM HG: CPT | Mod: CPTII,S$GLB,, | Performed by: FAMILY MEDICINE

## 2024-08-19 ENCOUNTER — TELEPHONE (OUTPATIENT)
Dept: UROLOGY | Facility: CLINIC | Age: 39
End: 2024-08-19
Payer: COMMERCIAL

## 2024-08-19 ENCOUNTER — PATIENT MESSAGE (OUTPATIENT)
Dept: UROLOGY | Facility: CLINIC | Age: 39
End: 2024-08-19
Payer: COMMERCIAL

## 2024-08-19 NOTE — TELEPHONE ENCOUNTER
Attempted to contact pt in regards to scheduled appt on 8/30/24 at 8:20 am w/Dr Emerson. No answer, left detailed voicemail for pt informing him that Dr Emerson will not be in clinic on this date. Informed him that we have an earlier appt on 8/23/24 at 11:00 am. Informed the pt he can give us a call back or reply via the pt portal.

## 2024-08-26 ENCOUNTER — TELEPHONE (OUTPATIENT)
Dept: UROLOGY | Facility: CLINIC | Age: 39
End: 2024-08-26
Payer: COMMERCIAL

## 2024-08-26 NOTE — TELEPHONE ENCOUNTER
Attempted to contact pt for the second time via phone. No answer, left detailed voicemail for pt informing him that his appt on 8/30/24 was cancelled. Informed him he can give us a call back to reschedule appt or reply back via pt portal.

## 2024-08-26 NOTE — TELEPHONE ENCOUNTER
----- Message from Xiao Garcia LPN sent at 8/26/2024  7:22 AM CDT -----  I cancelled his rosendo't for this Friday -still unable to reach him, can continue to try? thanks

## 2024-08-30 ENCOUNTER — CLINICAL SUPPORT (OUTPATIENT)
Dept: AUDIOLOGY | Facility: CLINIC | Age: 39
End: 2024-08-30
Payer: COMMERCIAL

## 2024-08-30 ENCOUNTER — OFFICE VISIT (OUTPATIENT)
Dept: SURGERY | Facility: CLINIC | Age: 39
End: 2024-08-30
Payer: COMMERCIAL

## 2024-08-30 VITALS
DIASTOLIC BLOOD PRESSURE: 76 MMHG | HEART RATE: 96 BPM | SYSTOLIC BLOOD PRESSURE: 118 MMHG | BODY MASS INDEX: 34.24 KG/M2 | HEIGHT: 70 IN | WEIGHT: 239.19 LBS

## 2024-08-30 DIAGNOSIS — H91.90 SUBJECTIVE HEARING CHANGE: ICD-10-CM

## 2024-08-30 DIAGNOSIS — H93.293 ABNORMAL AUDITORY PERCEPTION OF BOTH EARS: Primary | ICD-10-CM

## 2024-08-30 DIAGNOSIS — L72.9 CYST OF SKIN: ICD-10-CM

## 2024-08-30 PROCEDURE — 99999 PR PBB SHADOW E&M-EST. PATIENT-LVL II: CPT | Mod: PBBFAC,,, | Performed by: AUDIOLOGIST

## 2024-08-30 PROCEDURE — 99999 PR PBB SHADOW E&M-EST. PATIENT-LVL III: CPT | Mod: PBBFAC,,, | Performed by: SURGERY

## 2024-08-30 RX ORDER — SERTRALINE HYDROCHLORIDE 200 MG/1
200 CAPSULE ORAL
COMMUNITY

## 2024-08-30 NOTE — PROGRESS NOTES
Guillermo Wellington, a 39 y.o. male, was seen today in the clinic for an audiologic evaluation.  The patient's main complaint was hearing loss.  Pt stated that he feels that he is not hearing as well as he once was.  He is concerned that time spent using headphones in a previous job may have resulted in damage to his hearing.  He denied otalgia, aural fullness, tinnitus and vertigo.        Tympanometry revealed Type A in the right ear and Type A in the left ear.  Audiogram results revealed normal hearing bilaterally.  Speech reception thresholds were noted at 5 dB in the right ear and 5 dB in the left ear.  Speech discrimination scores were 100% in the right ear and 100% in the left ear.    Results were discussed with patient.  Pt should return for testing if any change in symptoms is noted.    Recommendations:  Otologic evaluation  Repeat audiogram as needed  Hearing protection when in noise

## 2024-08-30 NOTE — PROGRESS NOTES
[facility] H&P    Subjective:     Guillermo Wellington is a 39 y.o. male with no significant PMH  who presents to clinic for evaluation of a L neck cyst. He reports the cyst has been present for 15 years. It has been stable in size and is non-tender. The cyst intermittently drains a thick, white, foul-smelling discharge. He reports it has never been infected. He would like the cyst removed.     He is not on any blood thinners.     PMH: No past medical history on file.    Past Surgical History:   Past Surgical History:   Procedure Laterality Date    FRACTURE SURGERY      compound fracture       Social History:  Social History     Socioeconomic History    Marital status:    Tobacco Use    Smoking status: Former     Current packs/day: 0.00     Average packs/day: 1 pack/day for 10.1 years (10.1 ttl pk-yrs)     Types: Cigarettes, Vaping with nicotine     Start date: 2005     Quit date: 3/9/2015     Years since quittin.4    Tobacco comments:     I am still using nicotine lozenges. I have been on them since I quit.   Substance and Sexual Activity    Alcohol use: Not Currently    Drug use: Never    Sexual activity: Yes     Partners: Female     Social Determinants of Health     Financial Resource Strain: Low Risk  (2024)    Overall Financial Resource Strain (CARDIA)     Difficulty of Paying Living Expenses: Not very hard   Food Insecurity: No Food Insecurity (2024)    Hunger Vital Sign     Worried About Running Out of Food in the Last Year: Never true     Ran Out of Food in the Last Year: Never true   Physical Activity: Insufficiently Active (2024)    Exercise Vital Sign     Days of Exercise per Week: 2 days     Minutes of Exercise per Session: 20 min   Stress: No Stress Concern Present (2024)    [employer] - Occupational Stress Questionnaire     Feeling of Stress : Only a little   Housing Stability: Unknown (2024)    Housing Stability Vital  Sign     Unable to Pay for Housing in the Last Year: No       Allergies: Review of patient's allergies indicates:  No Known Allergies    Medications:  Current Outpatient Medications on File Prior to Visit   Medication Sig Dispense Refill    sertraline 200 mg Cap Take 200 mg by mouth.       No current facility-administered medications on file prior to visit.         Objective:     Vital Signs (Most Recent)  Pulse: 96 (08/30/24 0844)  BP: 118/76 (08/30/24 0844)    ROS A 10+ review of systems was performed with pertinent positives and negatives noted above in the history of present illness.  Other systems were negative unless otherwise specified.    Physical Exam:  Gen: awake, alert, in no acute distress  HEENT: normocephalic, atraumatic, EOMI, no scleral icterus. 1cm well circumscribed mobile mass palpated on the L lateral neck. No erythema or drainage  CV: regular rate and rhythm  Pulm: equal chest rise bilaterally, normal work of breathing  Abd:  soft, non-tender, no guarding  Ext: WWP, skin warm and dry      Assessment:     Guillermo Wellington is a 39 y.o. male with no PMH presenting for evaluation of a L neck cyst, likely a sebaceous cyst. Non-infected, will plan for removal in minors.     Plan:     - Plan for cyst removal in minor procedure room       Tamie Burnett MD   General Surgery PGY-1    I have personally taken the history and examined this patient and agree with the resident's note as stated above.         Guillermo Torres MD

## 2024-11-08 ENCOUNTER — PROCEDURE VISIT (OUTPATIENT)
Dept: SURGERY | Facility: CLINIC | Age: 39
End: 2024-11-08
Payer: COMMERCIAL

## 2024-11-08 VITALS
DIASTOLIC BLOOD PRESSURE: 77 MMHG | SYSTOLIC BLOOD PRESSURE: 124 MMHG | HEIGHT: 70 IN | HEART RATE: 105 BPM | WEIGHT: 239.19 LBS | BODY MASS INDEX: 34.24 KG/M2

## 2024-11-08 DIAGNOSIS — R22.1 MASS OF LEFT SIDE OF NECK: Primary | ICD-10-CM

## 2024-11-08 NOTE — PROCEDURES
Exc, Tumor Soft Tissue    Date/Time: 11/8/2024 11:00 AM    Performed by: Guillermo Torres Jr., MD  Authorized by: Guillermo Torres Jr., MD    Consent Done?:  Yes (Written)  Timeout: prior to procedure the correct patient, procedure, and site was verified    Prep: patient was prepped and draped in usual sterile fashion    Local anesthesia used?: Yes    Anesthesia:  Local infiltration  Local anesthetic:  Lidocaine 1% with epinephrine  Anesthetic total (ml):  3  Assistants?: No     I was present for the entire procedure.  Indications:  Cyst  Body area:  Neck / anterior thorax  Laterality:  Left  Position:  Supine  Anesthesia:  Local infiltration  Local anesthetic:  Lidocaine 1% with epinephrine  Excision type:  Skin  Malignancy:  Benign  Excision size (cm):  3  Scalpel size:  15  Incision type:  Single straight  Specimens?: Yes     Specimens submitted to pathology. (Cyst)   Hemostasis was obtained.  Estimated blood loss (cc):  3  Wound closure:  Simple  Wound repair size (cm):  3  Sutures:  3-0 Vicryl and 4-0 Monocryl  Sterile dressings:  Mastisol and Steri-strips  Post-op diagnosis:  Same as pre-op diagnosis.   Needle, instrument, and sponge counts were correct.   Patient tolerated the procedure well with no immediate complications.   Post-operative instructions were provided for the patient.   Patient was discharged and will follow up for wound check and pathology results.

## 2024-11-13 ENCOUNTER — PATIENT MESSAGE (OUTPATIENT)
Dept: RESEARCH | Facility: HOSPITAL | Age: 39
End: 2024-11-13
Payer: COMMERCIAL